# Patient Record
Sex: FEMALE | HISPANIC OR LATINO | URBAN - METROPOLITAN AREA
[De-identification: names, ages, dates, MRNs, and addresses within clinical notes are randomized per-mention and may not be internally consistent; named-entity substitution may affect disease eponyms.]

---

## 2017-03-21 ENCOUNTER — APPOINTMENT (OUTPATIENT)
Dept: URBAN - METROPOLITAN AREA CLINIC 319 | Age: 22
Setting detail: DERMATOLOGY
End: 2017-03-21

## 2017-03-21 DIAGNOSIS — L70.0 ACNE VULGARIS: ICD-10-CM

## 2017-03-21 PROCEDURE — OTHER TREATMENT REGIMEN: OTHER

## 2017-03-21 PROCEDURE — OTHER OBSERVATION: OTHER

## 2017-03-21 PROCEDURE — 99202 OFFICE O/P NEW SF 15 MIN: CPT

## 2017-03-21 PROCEDURE — OTHER REASSURANCE: OTHER

## 2017-03-21 PROCEDURE — OTHER PRESCRIPTION: OTHER

## 2017-03-21 PROCEDURE — OTHER COUNSELING: OTHER

## 2017-03-21 PROCEDURE — OTHER MIPS QUALITY: OTHER

## 2017-03-21 RX ORDER — AZELAIC ACID 0.2 G/G
CREAM CUTANEOUS
Qty: 1 | Refills: 3 | Status: ERX | COMMUNITY
Start: 2017-03-21

## 2017-03-21 ASSESSMENT — LOCATION DETAILED DESCRIPTION DERM
LOCATION DETAILED: LEFT INFERIOR CENTRAL MALAR CHEEK
LOCATION DETAILED: RIGHT INFERIOR CENTRAL MALAR CHEEK
LOCATION DETAILED: INFERIOR MID FOREHEAD

## 2017-03-21 ASSESSMENT — LOCATION SIMPLE DESCRIPTION DERM
LOCATION SIMPLE: INFERIOR FOREHEAD
LOCATION SIMPLE: RIGHT CHEEK
LOCATION SIMPLE: LEFT CHEEK

## 2017-03-21 ASSESSMENT — LOCATION ZONE DERM: LOCATION ZONE: FACE

## 2017-03-21 NOTE — PROCEDURE: TREATMENT REGIMEN
Initiate Treatment: Once acne is controlled we can treat scarring with fraxel in laser center [phone] \\nAzelex cream twice a day to face after washing Initiate Treatment: Once acne is controlled we can treat scarring with fraxel in laser center [phone] \\nAzelex cream twice a day to face after washing

## 2017-03-21 NOTE — PROCEDURE: OBSERVATION
Size Of Lesion In Cm (Optional): 0
Morphology Per Location (Optional): Monitor
Detail Level: Detailed